# Patient Record
Sex: FEMALE | Employment: OTHER
[De-identification: names, ages, dates, MRNs, and addresses within clinical notes are randomized per-mention and may not be internally consistent; named-entity substitution may affect disease eponyms.]

---

## 2024-05-30 ENCOUNTER — HOSPITAL ENCOUNTER (OUTPATIENT)
Facility: HOSPITAL | Age: 29
Setting detail: RECURRING SERIES
End: 2024-05-30
Payer: OTHER GOVERNMENT

## 2024-05-30 PROCEDURE — 97530 THERAPEUTIC ACTIVITIES: CPT

## 2024-05-30 PROCEDURE — 97161 PT EVAL LOW COMPLEX 20 MIN: CPT

## 2024-05-30 PROCEDURE — 97110 THERAPEUTIC EXERCISES: CPT

## 2024-05-30 NOTE — PROGRESS NOTES
[facility] at the [address], [address], [place] [address]   Phone: [phone]     Fax: [phone]       Plan of Care/ Statement of Necessity for Physical Therapy Services    Patient name: Bert Montero Start of Care: 2024   Referral source: Nilo Anderson PA-C : 1995    Medical Diagnosis: Other symptoms and signs involving the genitourinary system [R39.89]       Onset Date:2021    Treatment Diagnosis: M25.551  RIGHT HIP PAIN and M79.651  Pain in right thigh  and N39.3  STRESS INCONTINENCE (FEMALE) (MALE)                            Prior Hospitalization: see medical history Provider#: [id number]   Medications: Verified on Patient Summary List   Comorbidities: hx of seizures (last one was when she was 10yo); 1 vaginal delivery, PCOS    Prior Level of Function: prior to delivery of her first child in , patient was able to walk long distances, perform all job functions, and lift heavy weights for exercise without pain, limitation, or incontinence.      The Plan of Care and following information is based on the information from the initial evaluation.  Assessment/ key information: Pt presents to PT with c/c of right groin and anterolateral thigh pain. Symptoms onset post-partum around 2021 and are staying the same. Aggravating factors include: walkingfor any distance, vigorous activity(weight lifting), coughing, sneezing, laughing, and lifting/bending. Pt also endorses MERLY and gas incontinence that occurs with same activities and worsens when her leg hurts worse. Patient rates severity of problem as 5/10.     Upon objective orthopedic exam, pt demos: postural deviations, gait deviations, mechanical deviations with squatting+ limited depth and pain, hip mobility deficits, hip strength deficits, and hypertonicity and TTP over right TFL and piriformis .      Initiated HEP to address and educated patient on pelvic girdle anatomy to  functional strength for ADLs and to optimize firing patterns and resting tone of PFM to aid in improved walking tolerance.   Eval status: Hip MMT (right/left): flexion- 4-/4-. ER- 4/4. IR- 4-/4-. ABD- 4-!/NT.      5) FUNCTIONAL- Pt will demo ability to perform pain-free double-leg squat 10x to 18'' depth (standard height chair) with maintenance of neutral lumbar spine, equal weight bearing, and ankle awareness for improved ability to perform ADLs and work related tasks.  Eval status: 2 '' depth (measured from ischial tuberosities, feet hips width apart) with right anterolateral leg pain upon rising. Demos the following biomechanical deviations: uses WBOS and hip ER without cues, glute dominant squat.      Frequency / Duration: Patient to be seen 1 times per week for 15 treatments    Patient/ Caregiver education and instruction: Diagnosis, prognosis, self care, activity modification, and exercises    [x]  Plan of care has been reviewed with PTA    Certification Period: IVONNE Stephen, PT 5/30/2024 11:18 AM  _____________________________________________________________________  I certify that the above Therapy Services are being furnished while the patient is under my care. I agree with the treatment plan and certify that this therapy is necessary.    Physician's Signature:________________________Date:_________TIME:________                                      Nilo Anderson PA-C         ** Signature, Date and Time must be completed for valid certification **    Insurance: Payor:  EAST / Plan:  EAST PRIME / Product Type: *No Product type* /      Please sign and return to :  [facility] at the [address], [place] [address]           Phone: [phone]      Fax:  [phone]

## 2024-05-30 NOTE — PROGRESS NOTES
PT DAILY TREATMENT NOTE/Pelvic EVAL     Patient Name: Bert Montero    Date: 2024    : 1995  Insurance: Payor: U[employer] EAST / Plan:  EAST PRIME / Product Type: *No Product type* /      Patient  verified yes     Visit #   Current / Total 1 15   Time   In / Out 11:20am 12:28pm   Pain   In / Out 0 0   Subjective Functional Status/Changes: See below   Changes to:  Meds, Allergies, Med Hx, Sx Hx?  If yes, update Summary List See below       Treatment Area: Other symptoms and signs involving the genitourinary system [R39.89]    SUBJECTIVE  Primary complaint:   Pt presents to PT with c/c of right groin and anterolateral thigh pain. Symptoms onset post-partum around 2021 and are staying the same. Aggravating factors include: walkingfor any distance, vigorous activity(weight lifting), coughing, sneezing, laughing, and lifting/bending. Pt also endorses MERLY and gas incontinence that occurs with same activities and worsens when her leg hurts worse. Patient rates severity of problem as 5/10.    Additional details:  Reports that right leg pain + pelvic pain + LBP all began at same time and tend to occur together   Right groin pain is sharp and started immediately when walking especially if wearing  boots; then ache extends to anterolateral thigh if she walks \"too long\"  Sometimes her leg gives out on her; not correlated with strong pain; happens rarely now but usually occurs after doing strenuous activity all day. Denies falling; always caught herself  Pt does do exercise but is scared of lifting weights d/t fear of worsening sx  Has MERLY; does kegel exercises at home with vaginal weights (2x/week; 10-20 min) which has helped a little; sometimes gas and urine leak with laughing too hard; reports more incontinence in general if she is walking while in pain    ERIKA/onset of symptoms: see above  Functional limitations: see above  Diagnostic testing for primary complaint: N/A  Previous   Patient Education billed concurrently with other procedures     TOTAL TREATMENT TIME:        68       Physical Therapy Evaluation- Pelvic  Musculoskeletal Screen:    Posture: Anterior rib flare , Increased lumbar lordosis, and Increased anterior pelvic tilt      Gait: decreased trunk rotation, decreased forward rotation of pelvis on right, and lateral trunk lean to left       Squattin '' depth (measured from ischial tuberosities, feet hips width apart) with right anterolateral leg pain upon rising. Demos the following biomechanical deviations: uses WBOS and hip ER without cues, glute dominant squat.    Fingertips to floor distance: 1'' from fingertips to ground.     ROM:  hip AROM (right/left): FAER- 35/32 deg. FAIR- 30/45 deg. Flexion- 100!/116 deg. (Measured in supine)    MMT:  Hip MMT (right/left): flexion- 4-/4-. ER- 4/4. IR- 4-/4-. ABD- 4-!/NT.      Palpation: TTP over right TFL, right piriformis    ASSESSMENT/Changes in Function: Pt presents to PT with c/c of right groin and anterolateral thigh pain. Symptoms onset post-partum around 2021 and are staying the same. Aggravating factors include: walkingfor any distance, vigorous activity(weight lifting), coughing, sneezing, laughing, and lifting/bending. Pt also endorses MERLY and gas incontinence that occurs with same activities and worsens when her leg hurts worse. Patient rates severity of problem as 5/10.     Upon objective orthopedic exam, pt demos: postural deviations, gait deviations, mechanical deviations with squatting+ limited depth and pain, hip mobility deficits, hip strength deficits, and hypertonicity and TTP over right TFL and piriformis .      Initiated HEP to address and educated patient on pelvic girdle anatomy to explain examination findings and their influence on patient complaints and pain self-management strategies.    Impression:   Suspect right groin and LE pain and incontinence issues related to same musculoskeletal impairments

## 2024-05-31 ENCOUNTER — TELEPHONE (OUTPATIENT)
Facility: HOSPITAL | Age: 29
End: 2024-05-31

## 2024-06-04 ENCOUNTER — TELEPHONE (OUTPATIENT)
Facility: HOSPITAL | Age: 29
End: 2024-06-04

## 2024-06-06 ENCOUNTER — HOSPITAL ENCOUNTER (OUTPATIENT)
Facility: HOSPITAL | Age: 29
Setting detail: RECURRING SERIES
Discharge: HOME | End: 2024-06-09
Payer: OTHER GOVERNMENT

## 2024-06-06 PROCEDURE — 97140 MANUAL THERAPY 1/> REGIONS: CPT

## 2024-06-06 PROCEDURE — 97110 THERAPEUTIC EXERCISES: CPT

## 2024-06-06 PROCEDURE — 97112 NEUROMUSCULAR REEDUCATION: CPT

## 2024-06-06 PROCEDURE — 97530 THERAPEUTIC ACTIVITIES: CPT

## 2024-06-06 NOTE — PROGRESS NOTES
activities, farmers carry, etc), childcare specific tasks   May benefit from PFM assessment in future for further investigation of MERLY and gas incontinence    Patient will continue to benefit from skilled PT / OT services to modify and progress therapeutic interventions, analyze and address functional mobility deficits, analyze and address ROM deficits, analyze and address strength deficits, analyze and address soft tissue restrictions, analyze and cue for proper movement patterns, analyze and modify for postural abnormalities, analyze and address imbalance/dizziness, and instruct in home and community integration to address functional deficits and attain remaining goals.    Progress toward goals / Updated goals:  []  See Progress Note/Recertification    Short Term Goals: To be accomplished in 4 visits:  1) HEP- Patient will be independent and compliant with HEP to progress toward goals and restore functional mobility.   Eval Status: issued at eval   Status 6/6/24: progressed with 90-90 hip lift and FR step overs     Long Term Goals: To be accomplished in 15 visits:  1) PFDI- Patient will improve PFDI score by 5 points for improved self-efficacy in managing symptoms and improved QOL.   Eval Status: PFDI 18.75  FOTO score = an established functional score where 0 = no disability     2) FUNCTIONAL- Pt will be able participate in gym based exercise, regular long-distance walking, and work activities without being limited by pain and incontinence.  Eval Status: 2/10 worst pain in right anterior hip and thigh with any walking, after extended bouts of weight bearing activity     3) ROM- Pt will have pain-free hip flexion ER and IR AROM that is WFL and symmetrical bilaterally to aid in functional mechanics for ADLs to optimize firing patterns and resting tone of PFM and aid in improved walking tolerance.   Eval status: hip AROM (right/left): FAER- 35/32 deg. FAIR- 30/45 deg. Flexion- 100!/116 deg. (Measured in supine)

## 2024-06-20 ENCOUNTER — HOSPITAL ENCOUNTER (OUTPATIENT)
Facility: HOSPITAL | Age: 29
Setting detail: RECURRING SERIES
Discharge: HOME | End: 2024-06-23
Payer: OTHER GOVERNMENT

## 2024-06-20 PROCEDURE — 97530 THERAPEUTIC ACTIVITIES: CPT

## 2024-06-20 PROCEDURE — 97140 MANUAL THERAPY 1/> REGIONS: CPT

## 2024-06-20 PROCEDURE — 97110 THERAPEUTIC EXERCISES: CPT

## 2024-06-20 PROCEDURE — 97112 NEUROMUSCULAR REEDUCATION: CPT

## 2024-06-20 NOTE — PROGRESS NOTES
PHYSICAL / OCCUPATIONAL THERAPY - DAILY TREATMENT NOTE     Patient Name: Bert Montero    Date: 2024    : 1995  Insurance: Payor: Effective Measure EAST / Plan:  EAST PRIME / Product Type: *No Product type* /      Patient  verified Yes     Visit #   Current / Total 3 15   Time   In / Out 4:00pm 4:53pm   Pain   In / Out 0 0   Subjective Functional Status/Changes:   Pt bought a foam roller for doing HEP  Feels more flexible and a little less achey  She is having less MERLY with walking but no change in sx with coughing and sneezing   Changes to:  Allergies, Med Hx, Sx Hx?   no       TREATMENT AREA =  Other symptoms and signs involving the genitourinary system [R39.89]    OBJECTIVE    Therapeutic Procedures:  Tx Min Billable or 1:1 Min (if diff from Tx Min) Procedure, Rationale, Specifics   8  38978 Manual Therapy (timed):  decrease pain, increase ROM, and increase tissue extensibility to improve patient's ability to progress to PLOF and address remaining functional goals.  The manual therapy interventions were performed at a separate and distinct time from the therapeutic activities interventions . Details: right psoas setting; right hip lateral distraction with belt and passive stretching    Details if applicable:       25  [id number] Therapeutic Exercise (timed):  increase ROM, strength, coordination, balance, and proprioception to improve patient's ability to progress to PLOF and address remaining functional goals. (see flow sheet as applicable)    Details if applicable:     12  88511 Therapeutic Activity (timed):  use of dynamic activities replicating functional movements to increase ROM, strength, coordination, balance, and proprioception in order to improve patient's ability to progress to PLOF and address remaining functional goals.  (see flow sheet as applicable)     Details if applicable:  HEP additions, education, foam rolling demo and pt return demo   8  71827 Neuromuscular Re-Education (timed):

## 2024-06-25 ENCOUNTER — HOSPITAL ENCOUNTER (OUTPATIENT)
Facility: HOSPITAL | Age: 29
Setting detail: RECURRING SERIES
Discharge: HOME | End: 2024-06-28
Payer: OTHER GOVERNMENT

## 2024-06-25 PROCEDURE — 97110 THERAPEUTIC EXERCISES: CPT

## 2024-06-25 PROCEDURE — 97530 THERAPEUTIC ACTIVITIES: CPT

## 2024-06-25 PROCEDURE — 97112 NEUROMUSCULAR REEDUCATION: CPT

## 2024-06-25 PROCEDURE — 97140 MANUAL THERAPY 1/> REGIONS: CPT

## 2024-06-25 NOTE — PROGRESS NOTES
PHYSICAL / OCCUPATIONAL THERAPY - DAILY TREATMENT NOTE     Patient Name: Bert Montero    Date: 2024    : 1995  Insurance: Payor:  EAST / Plan:  EAST PRIME / Product Type: *No Product type* /      Patient  verified Yes     Visit #   Current / Total 4 15   Time   In / Out 2:00pm 3:00pm   Pain   In / Out 0 0   Subjective Functional Status/Changes:   Reports that she has had a \"kink in her neck\" for the past 3 days since doing overhead exercises at gym; its improving but she is still unable to turn head left and was unable to do her new HEP 2/2 pain  States that her hip issues and MERLY have been better    Changes to:  Allergies, Med Hx, Sx Hx?   no       TREATMENT AREA =  Other symptoms and signs involving the genitourinary system [R39.89]    OBJECTIVE    Therapeutic Procedures:  Tx Min Billable or 1:1 Min (if diff from Tx Min) Procedure, Rationale, Specifics   10  00476 Manual Therapy (timed):  decrease pain, increase ROM, and increase tissue extensibility to improve patient's ability to progress to PLOF and address remaining functional goals.  The manual therapy interventions were performed at a separate and distinct time from the therapeutic activities interventions . Details: right psoas setting; STM for left upper trap    Details if applicable:       24  31240 Therapeutic Exercise (timed):  increase ROM, strength, coordination, balance, and proprioception to improve patient's ability to progress to PLOF and address remaining functional goals. (see flow sheet as applicable)    Details if applicable:     17  44748 Therapeutic Activity (timed):  use of dynamic activities replicating functional movements to increase ROM, strength, coordination, balance, and proprioception in order to improve patient's ability to progress to PLOF and address remaining functional goals.  (see flow sheet as applicable)     Details if applicable:  HEP additions, self-massage techniques, return to running 
cues, glute dominant squat.   PN Status 6/25/2024: Progressing - 19'' depth; minimal pain. Assessed single-leg sit to stand (23'' table height) and observed notable imbalance and difficulty on left LE>right; suspect this contributes to right hip pain through compensation patterns. Initiated activities to address.    Summary of Care/ Key Functional Changes: Pt is making excellent progress in PT to address right hip pain and stress urinary incontinence . They voice subjective improvements in: MERLY volume and frequency; activity tolerance prior to onset of right hip pain; overall activity level. They demo objective improvements in: hip MMT bilat, right hip AROM, pain-free squat depth. See updated goals for further details.      Barriers to progress include: chronicity; multiple body regions/systems involved in complaint . Pt making progress as expected despite barriers.     Pt cont to be limited by: remaining MERLY with laughing and strenuous activities; remaining right hip pain with walking at brisk pace, with running.      Assessed single-leg sit to stand (23'' table height) and observed notable imbalance and difficulty on left LE>right; suspect this contributes to right hip pain with walking and running through compensation patterns. Initiated activities to address and performed return to running strength and balance testing.     Reviewed and revised HEP to better address remaining impairments. Educated pt re: role of pelvic PT in assessing and treating PFM dysfunction; explained intervaginal exam techniques and discussed recommendation for assessment if MERLY persists as other measures improve.      Pt was limited by acute onset of left side neck pain today which started 3 days ago after doing upper body weight training. Observed notable pain-limited decrease left cervical rotation ROM. Pain has limited her ability to engage with HEP for hip and pelvic floor. Pt responded well to manual therapy techniques and gentle

## 2024-07-02 ENCOUNTER — TELEPHONE (OUTPATIENT)
Facility: HOSPITAL | Age: 29
End: 2024-07-02

## 2024-07-02 NOTE — TELEPHONE ENCOUNTER
Called 2/2 NS. Patient answered and states she forgot about appointment today. Added patient to wait list for rest of week and confirmed next scheduled visit on 7/10.

## 2024-07-10 ENCOUNTER — HOSPITAL ENCOUNTER (OUTPATIENT)
Facility: HOSPITAL | Age: 29
Setting detail: RECURRING SERIES
Discharge: HOME | End: 2024-07-13
Payer: OTHER GOVERNMENT

## 2024-07-10 PROCEDURE — 97110 THERAPEUTIC EXERCISES: CPT

## 2024-07-10 PROCEDURE — 97535 SELF CARE MNGMENT TRAINING: CPT

## 2024-07-10 PROCEDURE — 97112 NEUROMUSCULAR REEDUCATION: CPT

## 2024-07-10 PROCEDURE — 97530 THERAPEUTIC ACTIVITIES: CPT

## 2024-07-17 ENCOUNTER — HOSPITAL ENCOUNTER (OUTPATIENT)
Facility: HOSPITAL | Age: 29
Setting detail: RECURRING SERIES
End: 2024-07-17
Payer: OTHER GOVERNMENT

## 2024-07-17 ENCOUNTER — TELEPHONE (OUTPATIENT)
Facility: HOSPITAL | Age: 29
End: 2024-07-17

## 2024-07-24 ENCOUNTER — TELEPHONE (OUTPATIENT)
Facility: HOSPITAL | Age: 29
End: 2024-07-24

## 2024-07-24 NOTE — TELEPHONE ENCOUNTER
Called 2/2 NS. LVM. Told patient to c/b and reschedule for opening tomorrow if she is able. Reminded of next scheduled appointment and of attendance policy. Stated that patient will be DC if she no shows another appointment.

## 2024-07-25 ENCOUNTER — TELEPHONE (OUTPATIENT)
Facility: HOSPITAL | Age: 29
End: 2024-07-25

## 2024-07-31 ENCOUNTER — APPOINTMENT (OUTPATIENT)
Facility: HOSPITAL | Age: 29
End: 2024-07-31
Payer: OTHER GOVERNMENT

## 2024-08-07 ENCOUNTER — CLINICAL DOCUMENTATION (OUTPATIENT)
Facility: HOSPITAL | Age: 29
End: 2024-08-07